# Patient Record
(demographics unavailable — no encounter records)

---

## 2025-02-03 NOTE — DISCUSSION/SUMMARY
[de-identified] : IMP: 29-year-old female, status post well-functioning left total hip replacement; stable and well functioning, with right knee chondromalacia patella, and suspected right knee inflammatory arthropathy. - I provided her with PT referrals for attention for both left hip and right knee. -  The right knee does not appear to be in the midst of a gout flare right now, so I do not think that any invasive treatment is warranted. - We did review that she can use oral anti-inflammatories is she experiences another flare; and to come into the office for an aspiration and cortisone injection. - Otherwise, we should cont. to f/u every 2-years with repeat left hip X-rays or earlier as needed.

## 2025-02-03 NOTE — ADDENDUM
[FreeTextEntry1] : Documented by Erendira Hendricks acting as a scribe under Dr. Archibald. 01/31/2025
[FreeTextEntry1] : Documented by Erendira Hendricks acting as a scribe under Dr. Archibald. 01/31/2025
Stable

## 2025-02-03 NOTE — END OF VISIT
[FreeTextEntry3] : Documented by Erendira Hendricks acting as a scribe for Dr. Surjit Archibald. 01/31/2025  All medical record entries made by the Scribe were at my, Dr. Surjit Archibald, direction and personally dictated by me on 01/31/2025. I have reviewed the chart and agree that the record accurately reflects my personal performance of the history, physical exam, assessment and plan. I have also personally directed, reviewed, and agreed with the chart.

## 2025-02-03 NOTE — PHYSICAL EXAM
[de-identified] : General appearance: well nourished and hydrated, pleasant, alert and oriented x 3, cooperative.   HEENT: normocephalic, EOM intact, wearing mask, external auditory canal clear.   Cardiovascular: no lower leg edema, no varicosities, dorsalis pedis pulses palpable and symmetric.   Lymphatics: no palpable lymphadenopathy, no lymphedema.   Neurologic: sensation is normal, no muscle weakness in upper or lower extremities, patella tendon reflexes present and symmetric.   Dermatologic: skin moist, warm, no rash.   Spine: cervical spine with normal lordosis and painless range of motion, thoracic spine with normal kyphosis and painless range of motion, lumbosacral spine with normal lordosis and painless range of motion.  No tenderness to palpation along midline spine and paraspinal musculature.  Sacroiliac joints nontender bilaterally. Negative SLR and crossed SLR tests bilaterally. Gait: normal.    Right knee: - Focal soft tissue swelling: none - Baker cyst: No palpable Baker's cysts - Ecchymosis: none - Erythema: none - Effusion: small - Wounds: none - Alignment: normal - Tenderness: non tender to palpation medial and lateral joint lines, non tender to palpation throughout the entire extensor entire extensive mechanism. She has no pain or crepitus with patellar compression test.  - ROM: 0-145 - Collateral laxity: demonstrate mildly increased laxity of the varus with a firm endpoint. - Cruciate laxity: stable - Popliteal angle (degrees): 75 - Quad strength: 5/5 - Extensor lag: none - Skyler: negative - Mathew: negative  Limb lengths: clinically equal  Left hip: - Focal soft tissue swelling: none - Ecchymosis: none - Erythema: none - Wounds: well healed lateral incision, benign appearing. - Tenderness: none - ROM:    - Flexion: 115   - Extension: 0   - Adduction: 20   - Abduction: 40   - Internal rotation in 90 degrees of hip flexion: 15   - External rotation in 90 degrees of hip flexion: 60 - KOLE: negative - FADIR: negative - Leigh Ann: negative - Stinchfield: negative - Flexor power: 5/5 - Abductor power: 5/5   [de-identified] : Right knee and left hip x-rays were taken today. Right knee demonstrates normal alignment, no arthritis, patella sits borderline fany and tracks centrally.  Left hip demonstrates unchanged appearance of her left total hip replacement as compared to prior imaging, without evidence of mechanical complication. There has been no change or progression regarding the radiolucent lines surrounding her acetabular component.

## 2025-02-03 NOTE — PHYSICAL EXAM
[de-identified] : General appearance: well nourished and hydrated, pleasant, alert and oriented x 3, cooperative.   HEENT: normocephalic, EOM intact, wearing mask, external auditory canal clear.   Cardiovascular: no lower leg edema, no varicosities, dorsalis pedis pulses palpable and symmetric.   Lymphatics: no palpable lymphadenopathy, no lymphedema.   Neurologic: sensation is normal, no muscle weakness in upper or lower extremities, patella tendon reflexes present and symmetric.   Dermatologic: skin moist, warm, no rash.   Spine: cervical spine with normal lordosis and painless range of motion, thoracic spine with normal kyphosis and painless range of motion, lumbosacral spine with normal lordosis and painless range of motion.  No tenderness to palpation along midline spine and paraspinal musculature.  Sacroiliac joints nontender bilaterally. Negative SLR and crossed SLR tests bilaterally. Gait: normal.    Right knee: - Focal soft tissue swelling: none - Baker cyst: No palpable Baker's cysts - Ecchymosis: none - Erythema: none - Effusion: small - Wounds: none - Alignment: normal - Tenderness: non tender to palpation medial and lateral joint lines, non tender to palpation throughout the entire extensor entire extensive mechanism. She has no pain or crepitus with patellar compression test.  - ROM: 0-145 - Collateral laxity: demonstrate mildly increased laxity of the varus with a firm endpoint. - Cruciate laxity: stable - Popliteal angle (degrees): 75 - Quad strength: 5/5 - Extensor lag: none - Skyler: negative - Mathew: negative  Limb lengths: clinically equal  Left hip: - Focal soft tissue swelling: none - Ecchymosis: none - Erythema: none - Wounds: well healed lateral incision, benign appearing. - Tenderness: none - ROM:    - Flexion: 115   - Extension: 0   - Adduction: 20   - Abduction: 40   - Internal rotation in 90 degrees of hip flexion: 15   - External rotation in 90 degrees of hip flexion: 60 - KOLE: negative - FADIR: negative - Leigh Ann: negative - Stinchfield: negative - Flexor power: 5/5 - Abductor power: 5/5   [de-identified] : Right knee and left hip x-rays were taken today. Right knee demonstrates normal alignment, no arthritis, patella sits borderline fany and tracks centrally.  Left hip demonstrates unchanged appearance of her left total hip replacement as compared to prior imaging, without evidence of mechanical complication. There has been no change or progression regarding the radiolucent lines surrounding her acetabular component.

## 2025-02-03 NOTE — DISCUSSION/SUMMARY
[de-identified] : IMP: 29-year-old female, status post well-functioning left total hip replacement; stable and well functioning, with right knee chondromalacia patella, and suspected right knee inflammatory arthropathy. - I provided her with PT referrals for attention for both left hip and right knee. -  The right knee does not appear to be in the midst of a gout flare right now, so I do not think that any invasive treatment is warranted. - We did review that she can use oral anti-inflammatories is she experiences another flare; and to come into the office for an aspiration and cortisone injection. - Otherwise, we should cont. to f/u every 2-years with repeat left hip X-rays or earlier as needed.

## 2025-02-03 NOTE — HISTORY OF PRESENT ILLNESS
[de-identified] : 01/31/2025: 30 y/o female following up for left total hip replacement, and a new complaint of intermittent right knee pain. We last saw her two years ago, at which time she  was doing well. She reports that the left hip has had stable function since then. She does have episodes of what she describes as increased awareness of the left  hip, but she reports that she is fully functional and that the hip does not restrict her from any physical activity. She does continue to participate intermittently in  physical therapy, though insurance authorization has become more of a challenge.   She separately described a recurrent issue with right anterior knee pain. Symptoms have been waxing and waning for the last ten to fifteen years, per her description. Upon further questioning she reports that the right knee sx's have been spontaneous, they typically last 10-14 days at a time. They come on without warning and they reside without any residual sx's. She is not currently experiencing any right knee discomfort.  08/01/2023: 28 y/o female presenting for follow-up evaluation for left total hip replacement. Patient had her hip replaced by Dr. 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 Patient denies any pain with her left hip or any issues with walking or ambulation. She is continuing with physical therapy and would like a new script for physical therapy. She notes some good relief and improvement of her ROM and would like to continue. She is not currently taking any pain medication. She has no significant allergies. She currently works as a teacher.     Pain levels include a current pain level of 0/10.    No exacerbating factors are noted. No relieving factors are noted.   She's been having a recurrent issue with right anterior knee pain. Symptoms have been waxing and waning for the last ten to fifteen years, per her description.

## 2025-02-03 NOTE — HISTORY OF PRESENT ILLNESS
[de-identified] : 01/31/2025: 30 y/o female following up for left total hip replacement, and a new complaint of intermittent right knee pain. We last saw her two years ago, at which time she  was doing well. She reports that the left hip has had stable function since then. She does have episodes of what she describes as increased awareness of the left  hip, but she reports that she is fully functional and that the hip does not restrict her from any physical activity. She does continue to participate intermittently in  physical therapy, though insurance authorization has become more of a challenge.   She separately described a recurrent issue with right anterior knee pain. Symptoms have been waxing and waning for the last ten to fifteen years, per her description. Upon further questioning she reports that the right knee sx's have been spontaneous, they typically last 10-14 days at a time. They come on without warning and they reside without any residual sx's. She is not currently experiencing any right knee discomfort.  08/01/2023: 28 y/o female presenting for follow-up evaluation for left total hip replacement. Patient had her hip replaced by Dr. 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 Patient denies any pain with her left hip or any issues with walking or ambulation. She is continuing with physical therapy and would like a new script for physical therapy. She notes some good relief and improvement of her ROM and would like to continue. She is not currently taking any pain medication. She has no significant allergies. She currently works as a teacher.     Pain levels include a current pain level of 0/10.    No exacerbating factors are noted. No relieving factors are noted.   She's been having a recurrent issue with right anterior knee pain. Symptoms have been waxing and waning for the last ten to fifteen years, per her description.